# Patient Record
Sex: FEMALE | Race: WHITE | HISPANIC OR LATINO | Employment: FULL TIME | URBAN - METROPOLITAN AREA
[De-identification: names, ages, dates, MRNs, and addresses within clinical notes are randomized per-mention and may not be internally consistent; named-entity substitution may affect disease eponyms.]

---

## 2018-03-14 ENCOUNTER — HOSPITAL ENCOUNTER (OUTPATIENT)
Dept: RADIOLOGY | Facility: MEDICAL CENTER | Age: 18
End: 2018-03-14
Attending: OBSTETRICS & GYNECOLOGY
Payer: COMMERCIAL

## 2018-03-14 DIAGNOSIS — R10.2 PELVIC PAIN IN FEMALE: ICD-10-CM

## 2018-03-14 PROCEDURE — 76830 TRANSVAGINAL US NON-OB: CPT

## 2018-07-14 ENCOUNTER — APPOINTMENT (OUTPATIENT)
Dept: RADIOLOGY | Facility: IMAGING CENTER | Age: 18
End: 2018-07-14
Attending: NURSE PRACTITIONER
Payer: COMMERCIAL

## 2018-07-14 ENCOUNTER — OFFICE VISIT (OUTPATIENT)
Dept: URGENT CARE | Facility: PHYSICIAN GROUP | Age: 18
End: 2018-07-14
Payer: COMMERCIAL

## 2018-07-14 VITALS
TEMPERATURE: 98.5 F | BODY MASS INDEX: 26.36 KG/M2 | WEIGHT: 148.8 LBS | OXYGEN SATURATION: 98 % | SYSTOLIC BLOOD PRESSURE: 114 MMHG | DIASTOLIC BLOOD PRESSURE: 76 MMHG | HEART RATE: 74 BPM | HEIGHT: 63 IN

## 2018-07-14 DIAGNOSIS — S69.92XA INJURY OF LEFT THUMB, INITIAL ENCOUNTER: ICD-10-CM

## 2018-07-14 DIAGNOSIS — M79.642 LEFT HAND PAIN: ICD-10-CM

## 2018-07-14 DIAGNOSIS — S63.602A SPRAIN OF LEFT THUMB, INITIAL ENCOUNTER: ICD-10-CM

## 2018-07-14 DIAGNOSIS — S60.112A: ICD-10-CM

## 2018-07-14 PROCEDURE — 73130 X-RAY EXAM OF HAND: CPT | Mod: TC,LT | Performed by: NURSE PRACTITIONER

## 2018-07-14 PROCEDURE — 99213 OFFICE O/P EST LOW 20 MIN: CPT | Performed by: NURSE PRACTITIONER

## 2018-07-14 ASSESSMENT — ENCOUNTER SYMPTOMS
FALLS: 1
WEAKNESS: 0
CHILLS: 0
TINGLING: 0
MYALGIAS: 1
BRUISES/BLEEDS EASILY: 0
SENSORY CHANGE: 0
FEVER: 0

## 2018-07-14 NOTE — PROGRESS NOTES
Subjective:      Joanne Mckay is a 17 y.o. female who presents with Finger Injury (L thumb injury, swelling, blood, pt fell off of chair and it landed on her hand, onset last night at 10pm)            HPI  Joanne is here for left thumb injury. States fell off chair last night and it fell on her left hand. States increased swelling, pain, bruising to left thumb since this morning. Ibuprofen 200 mg and ice x 20 minutes this morning. Right handed. 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 Denies numbness/tingling to left hand.    PMH:  has a past medical history of Anesthesia and Pain.  MEDS:   Current Outpatient Prescriptions:   •  NON SPECIFIED, , Disp: , Rfl:   •  ondansetron (ZOFRAN ODT) 4 MG TBDP, Take 1 Tab by mouth every 6 hours as needed for Nausea/Vomiting., Disp: 10 Tab, Rfl: 0  •  ibuprofen (MOTRIN) 400 MG TABS, Take 1 Tab by mouth every 6 hours as needed for Mild Pain., Disp: 30 Tab, Rfl: 0  ALLERGIES:   Allergies   Allergen Reactions   • Cefzil [Cefprozil] Rash     SURGHX:   Past Surgical History:   Procedure Laterality Date   • RECOVERY  3/24/2014    Performed by [facility] at SURGERY SAME DAY [facility] ORS   • GASTROSCOPY WITH BIOPSY  7/8/2013    Performed by Julissa Wolfe M.D. at SURGERY [facility] ORS   • OTHER  2010    tonsillectomy     SOCHX:  reports that she has never smoked. She has never used smokeless tobacco. She reports that she does not drink alcohol or use drugs.  FH: Family history was reviewed, no pertinent findings to report    Review of Systems   Constitutional: Negative for chills, fever and malaise/fatigue.   Musculoskeletal: Positive for falls, joint pain and myalgias.   Neurological: Negative for tingling, sensory change and weakness.   Endo/Heme/Allergies: Does not bruise/bleed easily.   All other systems reviewed and are negative.         Objective:     /76   Pulse 74   Temp 36.9 °C (98.5 °F)   Ht 1.6 m  "(5' 3\")   Wt 67.5 kg (148 lb 12.8 oz)   SpO2 98%   BMI 26.36 kg/m²      Physical Exam   Constitutional: She is oriented to person, place, and time. Vital signs are normal. She appears well-developed and well-nourished. She is active and cooperative.  Non-toxic appearance. She does not have a sickly appearance. She does not appear ill. No distress.   HENT:   Head: Normocephalic.   Eyes: Conjunctivae and EOM are normal. Pupils are equal, round, and reactive to light.   Cardiovascular: Normal rate.    Pulmonary/Chest: Effort normal.   Musculoskeletal:        Left hand: She exhibits tenderness, bony tenderness and swelling. She exhibits normal range of motion, normal capillary refill, no deformity and no laceration. Normal sensation noted. Decreased strength noted. She exhibits finger abduction and thumb/finger opposition.        Hands:  Swelling 2+ to left thumb from distal tip to left wrist on radial aspect. Bruising throughout, decreased ROM with fist making, finger opposition of thumb. TTP at entire thumb.   Neurological: She is alert and oriented to person, place, and time.   Skin: Skin is warm and dry. She is not diaphoretic.   Vitals reviewed.            Left thumb xray FINDINGS:  No fracture, dislocation, or acute joint abnormality is present.    MA applied thumb spica velcro wrist splint.  Assessment/Plan:     1. Injury of left thumb, initial encounter    - DX-HAND 3+ RIGHT; Future    2. Contusion of thumb (nail), left, initial encounter    3. Sprain of left thumb, initial encounter    May use NSAID prn for pain/swelling  May use cool compresses for swelling prn  May utilize RICE method prn  Avoid excessive weight lifting until pain/swelling improves  May apply topical analgesics prn  Perform proper body mechanics with lifting, twisting, bending and reaching. Ask for assistance with heavy objects  Monitor for deformity, numbness/tingling in fingers, decreased ROM with lifting difficulty- need " re-evaluation

## 2018-11-16 ENCOUNTER — HOSPITAL ENCOUNTER (OUTPATIENT)
Dept: RADIOLOGY | Facility: MEDICAL CENTER | Age: 18
End: 2018-11-16
Attending: PHYSICIAN ASSISTANT
Payer: COMMERCIAL

## 2018-11-16 ENCOUNTER — HOSPITAL ENCOUNTER (OUTPATIENT)
Facility: MEDICAL CENTER | Age: 18
End: 2018-11-16
Attending: PHYSICIAN ASSISTANT
Payer: COMMERCIAL

## 2018-11-16 ENCOUNTER — OFFICE VISIT (OUTPATIENT)
Dept: URGENT CARE | Facility: PHYSICIAN GROUP | Age: 18
End: 2018-11-16
Payer: COMMERCIAL

## 2018-11-16 VITALS
DIASTOLIC BLOOD PRESSURE: 60 MMHG | TEMPERATURE: 97.8 F | HEART RATE: 58 BPM | RESPIRATION RATE: 14 BRPM | SYSTOLIC BLOOD PRESSURE: 104 MMHG | WEIGHT: 150 LBS | BODY MASS INDEX: 26.58 KG/M2 | OXYGEN SATURATION: 98 % | HEIGHT: 63 IN

## 2018-11-16 DIAGNOSIS — R10.2 PELVIC PAIN: ICD-10-CM

## 2018-11-16 LAB
APPEARANCE UR: CLEAR
BILIRUB UR STRIP-MCNC: NORMAL MG/DL
COLOR UR AUTO: NORMAL
GLUCOSE UR STRIP.AUTO-MCNC: NORMAL MG/DL
INT CON NEG: NEGATIVE
INT CON POS: POSITIVE
KETONES UR STRIP.AUTO-MCNC: NORMAL MG/DL
LEUKOCYTE ESTERASE UR QL STRIP.AUTO: NORMAL
NITRITE UR QL STRIP.AUTO: NORMAL
PH UR STRIP.AUTO: 7 [PH] (ref 5–8)
POC URINE PREGNANCY TEST: NORMAL
PROT UR QL STRIP: NORMAL MG/DL
RBC UR QL AUTO: NORMAL
SP GR UR STRIP.AUTO: 1.01
UROBILINOGEN UR STRIP-MCNC: NORMAL MG/DL

## 2018-11-16 PROCEDURE — 87591 N.GONORRHOEAE DNA AMP PROB: CPT

## 2018-11-16 PROCEDURE — 87480 CANDIDA DNA DIR PROBE: CPT

## 2018-11-16 PROCEDURE — 99214 OFFICE O/P EST MOD 30 MIN: CPT | Performed by: PHYSICIAN ASSISTANT

## 2018-11-16 PROCEDURE — 76830 TRANSVAGINAL US NON-OB: CPT

## 2018-11-16 PROCEDURE — 87510 GARDNER VAG DNA DIR PROBE: CPT

## 2018-11-16 PROCEDURE — 87491 CHLMYD TRACH DNA AMP PROBE: CPT

## 2018-11-16 PROCEDURE — 81025 URINE PREGNANCY TEST: CPT | Performed by: PHYSICIAN ASSISTANT

## 2018-11-16 PROCEDURE — 87660 TRICHOMONAS VAGIN DIR PROBE: CPT

## 2018-11-16 PROCEDURE — 81002 URINALYSIS NONAUTO W/O SCOPE: CPT | Performed by: PHYSICIAN ASSISTANT

## 2018-11-16 ASSESSMENT — ENCOUNTER SYMPTOMS
ABDOMINAL PAIN: 1
SHORTNESS OF BREATH: 0
FEVER: 0
VOMITING: 0
PAIN: 1
DIAPHORESIS: 0
COUGH: 0
WEAKNESS: 0
PALPITATIONS: 0
WEIGHT LOSS: 0
DIZZINESS: 0
HEADACHES: 0
CHILLS: 0

## 2018-11-16 NOTE — PROGRESS NOTES
Subjective:      Joanne Mckay is a 18 y.o. female who presents with Pain (uterus pain x 2 weeks )            Pain   This is a new problem. Episode onset: 2 weeks ago. The problem occurs constantly. The problem has been unchanged. Associated symptoms include abdominal pain. Pertinent negatives include no chest pain, chills, coughing, diaphoresis, fever, headaches, rash, urinary symptoms, vomiting or weakness. Associated symptoms comments: Pelvic pain. Nothing aggravates the symptoms. She has tried nothing for the symptoms.       Review of Systems   Constitutional: Positive for malaise/fatigue. Negative for chills, diaphoresis, fever and weight loss.   Respiratory: Negative for cough and shortness of breath.    Cardiovascular: Negative for chest pain and palpitations.   Gastrointestinal: Positive for abdominal pain. Negative for vomiting.   Skin: Negative for itching and rash.   Neurological: Negative for dizziness, weakness and headaches.   All other systems reviewed and are negative.    PMH:  has a past medical history of Anesthesia and Pain.  MEDS:   Current Outpatient Prescriptions:   •  NON SPECIFIED, , Disp: , Rfl:   •  ondansetron (ZOFRAN ODT) 4 MG TBDP, Take 1 Tab by mouth every 6 hours as needed for Nausea/Vomiting., Disp: 10 Tab, Rfl: 0  •  ibuprofen (MOTRIN) 400 MG TABS, Take 1 Tab by mouth every 6 hours as needed for Mild Pain., Disp: 30 Tab, Rfl: 0  ALLERGIES:   Allergies   Allergen Reactions   • Cefzil [Cefprozil] Rash     SURGHX:   Past Surgical History:   Procedure Laterality Date   • RECOVERY  3/24/2014    Performed by -[facility] Surgery at SURGERY SAME DAY [facility] ORS   • GASTROSCOPY WITH BIOPSY  7/8/2013    Performed by Julissa Wolfe M.D. at SURGERY [facility] ORS   • OTHER  2010    tonsillectomy     SOCHX:  reports that she has never smoked. She has never used smokeless tobacco. She reports that she does not drink alcohol or use drugs.  FH: Family history was reviewed, no  "pertinent findings to report  Medications, Allergies, and current problem list reviewed today in Epic       Objective:     /60   Pulse (!) 58   Temp 36.6 °C (97.8 °F) (Temporal)   Resp 14   Ht 1.6 m (5' 3\")   Wt 68 kg (150 lb)   SpO2 98%   BMI 26.57 kg/m²      Physical Exam   Constitutional: She is oriented to person, place, and time. She appears well-developed and well-nourished. She is active.  Non-toxic appearance. She does not have a sickly appearance. She does not appear ill. No distress.   HENT:   Head: Normocephalic and atraumatic.   Right Ear: External ear normal.   Left Ear: External ear normal.   Nose: Nose normal.   Mouth/Throat: Oropharynx is clear and moist.   Eyes: Conjunctivae, EOM and lids are normal.   Neck: Normal range of motion and full passive range of motion without pain. Neck supple.   Cardiovascular: Normal rate, regular rhythm, S1 normal, S2 normal and normal heart sounds.  Exam reveals no gallop and no friction rub.    No murmur heard.  Pulmonary/Chest: Effort normal and breath sounds normal. No respiratory distress. She has no decreased breath sounds. She has no wheezes. She has no rales. She exhibits no tenderness.   Abdominal: Soft. Normal appearance and bowel sounds are normal. She exhibits no shifting dullness, no distension, no pulsatile liver, no fluid wave, no abdominal bruit, no ascites, no pulsatile midline mass and no mass. There is tenderness. There is no rigidity, no rebound, no guarding, no CVA tenderness, no tenderness at McBurney's point and negative Stapleton's sign. No hernia.   Genitourinary: Vagina normal.   Musculoskeletal: Normal range of motion. She exhibits no edema, tenderness or deformity.   Neurological: She is alert and oriented to person, place, and time.   Skin: Skin is warm, dry and intact. She is not diaphoretic.   Psychiatric: She has a normal mood and affect. Her speech is normal and behavior is normal. Judgment and thought content normal. " Cognition and memory are normal.   Vitals reviewed.              11/16/2018 2:51 PM    HISTORY/REASON FOR EXAM:  Pelvic pain      TECHNIQUE/EXAM DESCRIPTION:  Transabdominal and transvaginal pelvic ultrasound.    COMPARISON:   None    FINDINGS:  Both transabdominal and transvaginal scanning were performed to optimally visualize the pelvis.    The uterus measures 3.06 cm x 8.20 cm x 3.90 cm.  The uterine myometrium is within normal limits.  The endometrial echo complex measures 0.33 cm. There is an IUD present within the endometrial cavity.    Low resistive waveforms are confirmed within the ovaries.  The right ovary measures 4.33 cm x 2.87 cm x 2.51 cm.    The left ovary measures 3.08 cm x 1.58 cm x 2.28 cm.        There is no free fluid seen.   Impression       1.  IUD present within the endometrial cavity.    2.  No evidence of adnexal mass.       Assessment/Plan:   Patient is a 18-year-old female who presents with pelvic pain for 2 weeks.  She states that the pain is been constant and worsening in severity.  She states that she has an IUD is and is fearful it is out of place.  She denies any discharge or urinary symptoms.  Vital signs normal.  Mild pain to palpation no suprapubic area.  There is no rebound or guarding.  Pelvic exam was done with medical assistant in room.  The tail of the IUD is present in the cervical canal.  No significant purulent discharge is seen.  Ultrasound of the area shows no abnormalities.  Further treatment pending labs.    1. Pelvic pain    - POCT Urinalysis  - POCT Pregnancy  - CHLAMYDIA/GC PCR URINE OR SWAB; Future  - VAGINAL PATHOGENS DNA PANEL; Future  - US-PELVIC COMPLETE (TRANSABDOMINAL/TRANSVAGINAL) (COMBO); Future    Differential diagnosis, natural history, supportive care discussed. Follow-up with primary care provider within 7-10 days, emergency room precautions discussed.  Patient and/or family appears understanding of information.  Handout and review of patients diagnosis  and treatment was discussed extensively.

## 2018-11-17 LAB
C TRACH DNA SPEC QL NAA+PROBE: NEGATIVE
CANDIDA DNA VAG QL PROBE+SIG AMP: NEGATIVE
G VAGINALIS DNA VAG QL PROBE+SIG AMP: POSITIVE
N GONORRHOEA DNA SPEC QL NAA+PROBE: NEGATIVE
SPECIMEN SOURCE: NORMAL
T VAGINALIS DNA VAG QL PROBE+SIG AMP: NEGATIVE

## 2018-11-18 ENCOUNTER — TELEPHONE (OUTPATIENT)
Dept: URGENT CARE | Facility: CLINIC | Age: 18
End: 2018-11-18

## 2018-11-18 DIAGNOSIS — N76.0 BV (BACTERIAL VAGINOSIS): ICD-10-CM

## 2018-11-18 DIAGNOSIS — B96.89 BV (BACTERIAL VAGINOSIS): ICD-10-CM

## 2018-11-18 RX ORDER — CLINDAMYCIN HYDROCHLORIDE 300 MG/1
300 CAPSULE ORAL 2 TIMES DAILY
Qty: 14 CAP | Refills: 0 | Status: SHIPPED | OUTPATIENT
Start: 2018-11-18 | End: 2018-11-25

## 2023-01-08 ENCOUNTER — HOSPITAL ENCOUNTER (EMERGENCY)
Facility: MEDICAL CENTER | Age: 23
End: 2023-01-08
Attending: STUDENT IN AN ORGANIZED HEALTH CARE EDUCATION/TRAINING PROGRAM
Payer: COMMERCIAL

## 2023-01-08 VITALS
HEIGHT: 63 IN | HEART RATE: 66 BPM | SYSTOLIC BLOOD PRESSURE: 100 MMHG | OXYGEN SATURATION: 97 % | RESPIRATION RATE: 18 BRPM | DIASTOLIC BLOOD PRESSURE: 57 MMHG | BODY MASS INDEX: 31.09 KG/M2 | WEIGHT: 175.49 LBS | TEMPERATURE: 97.7 F

## 2023-01-08 DIAGNOSIS — Z3A.15 15 WEEKS GESTATION OF PREGNANCY: ICD-10-CM

## 2023-01-08 DIAGNOSIS — E86.0 DEHYDRATION: ICD-10-CM

## 2023-01-08 DIAGNOSIS — R10.10 PAIN OF UPPER ABDOMEN: ICD-10-CM

## 2023-01-08 LAB
ALBUMIN SERPL BCP-MCNC: 4 G/DL (ref 3.2–4.9)
ALBUMIN/GLOB SERPL: 1.4 G/DL
ALP SERPL-CCNC: 69 U/L (ref 30–99)
ALT SERPL-CCNC: 32 U/L (ref 2–50)
ANION GAP SERPL CALC-SCNC: 13 MMOL/L (ref 7–16)
AST SERPL-CCNC: 29 U/L (ref 12–45)
B-HCG SERPL-ACNC: ABNORMAL MIU/ML (ref 0–5)
BASOPHILS # BLD AUTO: 0.5 % (ref 0–1.8)
BASOPHILS # BLD: 0.05 K/UL (ref 0–0.12)
BILIRUB SERPL-MCNC: 0.4 MG/DL (ref 0.1–1.5)
BUN SERPL-MCNC: 6 MG/DL (ref 8–22)
CALCIUM ALBUM COR SERPL-MCNC: 9.5 MG/DL (ref 8.5–10.5)
CALCIUM SERPL-MCNC: 9.5 MG/DL (ref 8.5–10.5)
CHLORIDE SERPL-SCNC: 103 MMOL/L (ref 96–112)
CO2 SERPL-SCNC: 21 MMOL/L (ref 20–33)
CREAT SERPL-MCNC: 0.35 MG/DL (ref 0.5–1.4)
EOSINOPHIL # BLD AUTO: 0.09 K/UL (ref 0–0.51)
EOSINOPHIL NFR BLD: 0.8 % (ref 0–6.9)
ERYTHROCYTE [DISTWIDTH] IN BLOOD BY AUTOMATED COUNT: 39.6 FL (ref 35.9–50)
GFR SERPLBLD CREATININE-BSD FMLA CKD-EPI: 146 ML/MIN/1.73 M 2
GLOBULIN SER CALC-MCNC: 2.8 G/DL (ref 1.9–3.5)
GLUCOSE SERPL-MCNC: 85 MG/DL (ref 65–99)
HCT VFR BLD AUTO: 39.3 % (ref 37–47)
HGB BLD-MCNC: 13.6 G/DL (ref 12–16)
IMM GRANULOCYTES # BLD AUTO: 0.08 K/UL (ref 0–0.11)
IMM GRANULOCYTES NFR BLD AUTO: 0.8 % (ref 0–0.9)
LIPASE SERPL-CCNC: 37 U/L (ref 11–82)
LYMPHOCYTES # BLD AUTO: 1.86 K/UL (ref 1–4.8)
LYMPHOCYTES NFR BLD: 17.5 % (ref 22–41)
MCH RBC QN AUTO: 31.5 PG (ref 27–33)
MCHC RBC AUTO-ENTMCNC: 34.6 G/DL (ref 33.6–35)
MCV RBC AUTO: 91 FL (ref 81.4–97.8)
MONOCYTES # BLD AUTO: 0.71 K/UL (ref 0–0.85)
MONOCYTES NFR BLD AUTO: 6.7 % (ref 0–13.4)
NEUTROPHILS # BLD AUTO: 7.83 K/UL (ref 2–7.15)
NEUTROPHILS NFR BLD: 73.7 % (ref 44–72)
NRBC # BLD AUTO: 0 K/UL
NRBC BLD-RTO: 0 /100 WBC
PLATELET # BLD AUTO: 339 K/UL (ref 164–446)
PMV BLD AUTO: 10.1 FL (ref 9–12.9)
POTASSIUM SERPL-SCNC: 3.5 MMOL/L (ref 3.6–5.5)
PROT SERPL-MCNC: 6.8 G/DL (ref 6–8.2)
RBC # BLD AUTO: 4.32 M/UL (ref 4.2–5.4)
SODIUM SERPL-SCNC: 137 MMOL/L (ref 135–145)
WBC # BLD AUTO: 10.6 K/UL (ref 4.8–10.8)

## 2023-01-08 PROCEDURE — 80053 COMPREHEN METABOLIC PANEL: CPT

## 2023-01-08 PROCEDURE — 83690 ASSAY OF LIPASE: CPT

## 2023-01-08 PROCEDURE — 700105 HCHG RX REV CODE 258: Performed by: STUDENT IN AN ORGANIZED HEALTH CARE EDUCATION/TRAINING PROGRAM

## 2023-01-08 PROCEDURE — 84702 CHORIONIC GONADOTROPIN TEST: CPT

## 2023-01-08 PROCEDURE — 85025 COMPLETE CBC W/AUTO DIFF WBC: CPT

## 2023-01-08 PROCEDURE — 36415 COLL VENOUS BLD VENIPUNCTURE: CPT

## 2023-01-08 PROCEDURE — 700102 HCHG RX REV CODE 250 W/ 637 OVERRIDE(OP): Performed by: STUDENT IN AN ORGANIZED HEALTH CARE EDUCATION/TRAINING PROGRAM

## 2023-01-08 PROCEDURE — 99284 EMERGENCY DEPT VISIT MOD MDM: CPT

## 2023-01-08 PROCEDURE — A9270 NON-COVERED ITEM OR SERVICE: HCPCS | Performed by: STUDENT IN AN ORGANIZED HEALTH CARE EDUCATION/TRAINING PROGRAM

## 2023-01-08 RX ORDER — ACETAMINOPHEN 500 MG
1000 TABLET ORAL ONCE
Status: COMPLETED | OUTPATIENT
Start: 2023-01-08 | End: 2023-01-08

## 2023-01-08 RX ORDER — SODIUM CHLORIDE 9 MG/ML
1000 INJECTION, SOLUTION INTRAVENOUS ONCE
Status: COMPLETED | OUTPATIENT
Start: 2023-01-08 | End: 2023-01-08

## 2023-01-08 RX ADMIN — SODIUM CHLORIDE 1000 ML: 9 INJECTION, SOLUTION INTRAVENOUS at 05:01

## 2023-01-08 RX ADMIN — ACETAMINOPHEN 1000 MG: 500 TABLET ORAL at 05:01

## 2023-01-08 NOTE — ED PROVIDER NOTES
"ED Provider Note    CHIEF COMPLAINT  Chief Complaint   Patient presents with    Abdominal Pain     1.5x hours ago pt states she was sleeping and woke up with sharp abd pain across diaphragm that radiates into back, Pt denies N/V or vaginal bleeding.     Pregnancy     15 weeks 5 days       EXTERNAL RECORDS REVIEWED  Select: Other      HPI/ROS  LIMITATION TO HISTORY   Select: : None  OUTSIDE HISTORIAN(S):  Select: Significant other      Joanne Yao is a 22 y.o. female who presents evaluation of intermittent episodes of bilateral upper abdominal cramping which started about 2 AM this morning.  Patient is a  at 15 weeks and 5 weeks gestation via ultrasound dating.  States she was sleeping and woke up with a sharp pain in her bilateral upper quadrants of her abdomen.  The last approximately 2 minutes is moderate in severity nonradiating and then resolved.  Said no fevers no nausea no vomiting denies any diarrhea increased urinary frequency urgency.  Denies any lower abdominal pain vaginal bleeding vaginal discharge.    PAST MEDICAL HISTORY       SURGICAL HISTORY  patient denies any surgical history    FAMILY HISTORY  History reviewed. No pertinent family history.    SOCIAL HISTORY  Social History     Tobacco Use    Smoking status: Never    Smokeless tobacco: Never   Vaping Use    Vaping Use: Never used   Substance and Sexual Activity    Alcohol use: Not Currently    Drug use: Not Currently    Sexual activity: Not on file       CURRENT MEDICATIONS  Home Medications       Reviewed by Rylan Morales R.N. (Registered Nurse) on 23 at 0342  Med List Status: Not Addressed     Medication Last Dose Status        Patient Noah Taking any Medications                           ALLERGIES  Allergies   Allergen Reactions    Banana        PHYSICAL EXAM  VITAL SIGNS: /72   Pulse 89   Temp 36.6 °C (97.8 °F) (Temporal)   Resp 16   Ht 1.6 m (5' 3\")   Wt 79.6 kg (175 lb 7.8 oz)   SpO2 99%   BMI 31.09 " kg/m²      Pulse ox interpretation: I interpret this pulse ox as normal.  VITALS - vital signs documented prior to this note have been reviewed and noted,  GENERAL - awake, alert, oriented, GCS 15, no apparent distress, non-toxic  appearing  HEENT - normocephalic, atraumatic, pupils equal, sclera anicteric, mucus  membranes moist  NECK - supple, no meningismus, full active range of motion, trachea midline  CARDIOVASCULAR - regular rate/rhythm, no murmurs/gallops/rubs  PULMONARY - no respiratory distress, speaking in full sentences, clear to  auscultation bilaterally, no wheezing/ronchi/rales, no accessory muscle use  GASTROINTESTINAL -gravid bowel sounds present normal active negative Stapleton sign no McBurney's point tenderness no rebound guarding or peritonitis no CVA tenderness no palpable abdominal masses no overlying abrasions lesions or contusions  GENITOURINARY - Deferred  NEUROLOGIC - Awake alert, normal mental status, speech fluid, cognition  normal, moves all extremities  MUSCULOSKELETAL - no obvious asymmetry or deformities present  EXTREMITIES - warm, well-perfused, no cyanosis or significant edema  DERMATOLOGIC - warm, dry, no rashes, no jaundice  PSYCHIATRIC - normal affect, normal insight, normal concentration    DIAGNOSTIC STUDIES / PROCEDURES      LABS  Obtained remarkable for a positive hCG otherwise nonactionable point-of-care ultrasound shows a single live IUP with active fetal motion with a heart rate of 134    RADIOLOGY  I have independently interpreted the diagnostic imaging associated with this visit and am waiting the final reading from the radiologist.           COURSE & MEDICAL DECISION MAKING    ED Observation Status? No; Patient does not meet criteria for ED Observation.     INITIAL ASSESSMENT AND PLAN  Care Narrative:     Presents for evaluation of bilateral upper abdominal cramping.  Differential initially included was not limited to cholecystitis symptomatic cholelithiasis pancreatitis  hepatitis gastritis enteritis, appendicitis, among many other considerations.  On examination she has a benign and reassuring abdominal exam.  Labs were obtained to evaluate for above, were nonactionable.  A bedside ultrasound was performed did show a single live IUP.  Patient did feel better after IV fluids and Tylenol.  Unclear exact etiology of her abdominal discomfort however at this point in time her history and physical exam is not consistent with an underlying acute appendicitis, cholecystitis, perforated  bowel or viscus, and at this point I do not see an indication that would warrant emergent CT abdomen pelvis at this time and believe the risks of radiation and contrast outweigh the benefits at this time.  Shared decision making conversation was had with the patient.  Repeat abdominal exam in the emergency department was reassuring showing no rebound guarding or peritonitis patient was able to tolerate p.o. prior to discharge. Instructed to return in 24-48 hours for an abdominal re check if persisting symptoms or worsening symptoms. We did discuss the symptoms return was concerning and necessitate immediate return, and also discussed close follow-up with primary doctor.      ADDITIONAL PROBLEM LIST AND DISPOSITION    Problem #1: Abdominal pain labs IV fluids Tylenol  Problem #2: Intrauterine pregnancy OB follow-up    I have discussed management of the patient with the following physicians and PARISH's: None    Discussion of management with other QHP or appropriate source(s): Select: None     Escalation of care considered, and ultimately not performed: diagnostic imaging.  Given the reassuring labs do not see an indication for CT abdomen pelvis, no right upper quadrant tenderness negative Stapleton sign doubt underlying cholecystitis do not see an indication for right upper quadrant ultrasound.[email or web address]    Barriers to care at this time, including but not limited to: Select: None .     Decision tools and prescription  drugs considered including, but not limited to: Select: Pain Medications   .    HYDRATION: Based on the patient's presentation of Dehydration the patient was given IV fluids. IV Hydration was used because oral hydration was not as rapid as required. Upon recheck following hydration, the patient was improved.        FINAL DIAGNOSIS  1. Pain of upper abdomen    2. 15 weeks gestation of pregnancy    3. Dehydration           Electronically signed by: Enoc Calle D.O., 1/8/2023 5:01 AM

## 2023-01-08 NOTE — DISCHARGE INSTRUCTIONS
If you develop any fevers constant right upper quadrant abdominal pain or unable to eat or drink develop any vaginal bleeding severe lower abdominal pain return right away for recheck stay well-hydrated Tylenol as needed for discomfort return with any other new or concerning symptoms

## 2023-01-08 NOTE — ED TRIAGE NOTES
Chief Complaint   Patient presents with    Abdominal Pain     1.5x hours ago pt states she was sleeping and woke up with sharp abd pain across diaphragm that radiates into back, Pt denies N/V or vaginal bleeding.     Pregnancy     15 weeks 5 days     Pt ambulatory to triage for above complaint. Pt states she saw her OB on 12/5, received an US with normal results.      Pt is alert/oriented and follows commands. Pt speaking in full sentences and responds appropriately to questions. No acute distress noted in triage and respirations are even and unlabored.     Pt placed in lobby and educated on triage process. Pt encouraged to alert staff for any changes in condition.

## 2023-01-08 NOTE — ED NOTES
Round on pt and family. Respirations are even and unlabored. No further assistance needed at this time.

## 2023-01-08 NOTE — ED NOTES
Pt ambulated with normal steady gait from ER lobby to pt room. Pt asked to dress in gown. BP cuff and pulse ox attached to cardiac monitor. Chart up for ERP.

## 2023-06-09 NOTE — ED NOTES
Discharge instructions provided to pt. ERP went over discharge instructions with pt. Pt instructed to follow up with PCP. Pt instructed to return to emergency department for new or worsening symptoms. Pt verbalized understanding of discharge instructions.     [Normocephalic] : normocephalic [EOMI] : extra ocular movement intact [Supple] : supple [No Supraclavicular Adenopathy] : no supraclavicular adenopathy [No Cervical Adenopathy] : no cervical adenopathy [de-identified] : Bilateral breast/axilla/supraclavicular area: No masses, discharge, or adenopathy\par

## 2023-10-09 PROBLEM — G56.03 BILATERAL CARPAL TUNNEL SYNDROME: Status: ACTIVE | Noted: 2023-10-09

## 2023-10-30 PROBLEM — G56.01 RIGHT CARPAL TUNNEL SYNDROME: Status: ACTIVE | Noted: 2023-10-30

## 2024-11-15 ENCOUNTER — OFFICE VISIT (OUTPATIENT)
Dept: URGENT CARE | Facility: PHYSICIAN GROUP | Age: 24
End: 2024-11-15
Payer: COMMERCIAL

## 2024-11-15 VITALS
RESPIRATION RATE: 16 BRPM | DIASTOLIC BLOOD PRESSURE: 66 MMHG | HEIGHT: 63 IN | WEIGHT: 162 LBS | HEART RATE: 95 BPM | TEMPERATURE: 98.4 F | BODY MASS INDEX: 28.7 KG/M2 | SYSTOLIC BLOOD PRESSURE: 114 MMHG | OXYGEN SATURATION: 99 %

## 2024-11-15 DIAGNOSIS — R06.02 SOB (SHORTNESS OF BREATH): ICD-10-CM

## 2024-11-15 DIAGNOSIS — J01.90 ACUTE BACTERIAL SINUSITIS: ICD-10-CM

## 2024-11-15 DIAGNOSIS — B96.89 ACUTE BACTERIAL SINUSITIS: ICD-10-CM

## 2024-11-15 PROCEDURE — 3074F SYST BP LT 130 MM HG: CPT | Performed by: NURSE PRACTITIONER

## 2024-11-15 PROCEDURE — 3078F DIAST BP <80 MM HG: CPT | Performed by: NURSE PRACTITIONER

## 2024-11-15 PROCEDURE — 99203 OFFICE O/P NEW LOW 30 MIN: CPT | Performed by: NURSE PRACTITIONER

## 2024-11-15 RX ORDER — ALBUTEROL SULFATE 90 UG/1
2 INHALANT RESPIRATORY (INHALATION) EVERY 6 HOURS PRN
Qty: 8.5 G | Refills: 0 | Status: SHIPPED | OUTPATIENT
Start: 2024-11-15

## 2024-11-15 RX ORDER — IPRATROPIUM BROMIDE 21 UG/1
SPRAY, METERED NASAL
COMMUNITY
Start: 2024-11-13

## 2024-11-15 RX ORDER — FLUTICASONE PROPIONATE 50 MCG
2 SPRAY, SUSPENSION (ML) NASAL DAILY
Qty: 9.9 ML | Refills: 0 | Status: SHIPPED | OUTPATIENT
Start: 2024-11-15

## 2024-11-15 RX ORDER — METOCLOPRAMIDE 10 MG/1
TABLET ORAL
COMMUNITY

## 2024-11-15 RX ORDER — BENZONATATE 100 MG/1
100 CAPSULE ORAL 3 TIMES DAILY PRN
Qty: 60 CAPSULE | Refills: 0 | Status: SHIPPED | OUTPATIENT
Start: 2024-11-15

## 2024-11-15 RX ORDER — ERGOCALCIFEROL 1.25 MG/1
1 CAPSULE, LIQUID FILLED ORAL
COMMUNITY

## 2024-11-15 ASSESSMENT — ENCOUNTER SYMPTOMS
CARDIOVASCULAR NEGATIVE: 1
NEUROLOGICAL NEGATIVE: 1
CHILLS: 0
SHORTNESS OF BREATH: 1
WHEEZING: 0
SORE THROAT: 0
MUSCULOSKELETAL NEGATIVE: 1
SINUS PAIN: 1
EYE DISCHARGE: 0
FEVER: 0
GASTROINTESTINAL NEGATIVE: 1
EYE REDNESS: 0
COUGH: 1

## 2024-11-15 ASSESSMENT — FIBROSIS 4 INDEX: FIB4 SCORE: 0.39

## 2024-11-15 NOTE — PROGRESS NOTES
Subjective     Joanne Yao is a 24 y.o. female who presents with Cough (Congested x 2 weeks )            Cough  Associated symptoms include shortness of breath. Pertinent negatives include no chills, ear pain, eye redness, fever, sore throat or wheezing. There is no history of environmental allergies.   Joanne has come into urgent care today as she has been experiencing upper respiratory symptoms x 2 weeks.  Nasal congestion, runny nose, postnasal drainage, dry scratchy throat, cough.  Experiencing shortness of breath without wheezing.  No history of asthma or seasonal allergies.  Has been using over-the-counter DayQuil x 3-4 days.  No nasal spray or rinse, no salt water gargle.  Denies fever, nausea, vomiting.    PMH:  has a past medical history of Anesthesia and Pain.  MEDS:   Current Outpatient Medications:   •  amoxicillin-clavulanate (AUGMENTIN) 875-125 MG Tab, Take 1 Tablet by mouth 2 times a day for 5 days., Disp: 10 Tablet, Rfl: 0  •  albuterol 108 (90 Base) MCG/ACT Aero Soln inhalation aerosol, Inhale 2 Puffs every 6 hours as needed for Shortness of Breath., Disp: 8.5 g, Rfl: 0  •  benzonatate (TESSALON) 100 MG Cap, Take 1 Capsule by mouth 3 times a day as needed for Cough., Disp: 60 Capsule, Rfl: 0  •  fluticasone (FLONASE) 50 MCG/ACT nasal spray, Administer 2 Sprays into affected nostril(S) every day., Disp: 9.9 mL, Rfl: 0  •  vitamin D2, Ergocalciferol, (DRISDOL) 1.25 MG (43636 UT) Cap capsule, 1 Capsule. (Patient not taking: Reported on 11/15/2024), Disp: , Rfl:   •  ipratropium (ATROVENT) 0.03 % Solution, , Disp: , Rfl:   •  metoclopramide (REGLAN) 10 MG Tab, TAKE 1 TABLET (10 MG TOTAL) BY MOUTH EVERY 8 (EIGHT) HOURS IF NEEDED (NAUSEA) FOR UP TO 12 DOSES. (Patient not taking: Reported on 11/15/2024), Disp: , Rfl:   •  Nutritional Supplements (VITAMIN D BOOSTER PO), , Disp: , Rfl:   •  Prenatal Vit-DSS-Fe Cbn-FA (PRENATAL AD PO), Take  by mouth. (Patient not taking: Reported on 11/15/2024),  "Disp: , Rfl:   ALLERGIES:   Allergies   Allergen Reactions   • Banana    • Banana Extract Allergy Skin Test Rash   • Cefzil [Cefprozil] Rash     SURGHX:   Past Surgical History:   Procedure Laterality Date   • [place] NEUROPLASTY & OR TRANSPOS MEDIAN NRV CARPAL GLENDY Left 10/17/2023    Procedure: LEFT HAND OPEN CARPAL TUNNEL RELEASE;  Surgeon: Milton Richards M.D.;  Location: [facility];  Service: Orthopedics   • RECOVERY  3/24/2014    Performed by [facility] at SURGERY SAME DAY [facility] ORS   • GASTROSCOPY WITH BIOPSY  7/8/2013    Performed by Julissa Wolfe M.D. at SURGERY [facility] ORS   • OTHER  2010    tonsillectomy     SOCHX:  reports that she has never smoked. She has never used smokeless tobacco. She reports that she does not currently use alcohol. She reports that she does not currently use drugs.  FH: Family history was reviewed, no pertinent findings to report      Review of Systems   Constitutional:  Negative for chills, fever and malaise/fatigue.   HENT:  Positive for congestion and sinus pain. Negative for ear pain and sore throat.    Eyes:  Negative for discharge and redness.   Respiratory:  Positive for cough and shortness of breath. Negative for wheezing.    Cardiovascular: Negative.    Gastrointestinal: Negative.    Musculoskeletal: Negative.    Neurological: Negative.    Endo/Heme/Allergies:  Negative for environmental allergies.   All other systems reviewed and are negative.             Objective     /66 (BP Location: Right arm, Patient Position: Sitting, BP Cuff Size: Adult)   Pulse 95   Temp 36.9 °C (98.4 °F) (Temporal)   Resp 16   Ht 1.6 m (5' 3\")   Wt 73.5 kg (162 lb)   SpO2 99%   BMI 28.70 kg/m²      Physical Exam  Vitals reviewed.   Constitutional:       General: She is awake. She is not in acute distress.     Appearance: Normal appearance. She is not ill-appearing, toxic-appearing or diaphoretic.   HENT:      Head: Normocephalic.      Right Ear: " Ear canal and external ear normal. A middle ear effusion is present.      Left Ear: Tympanic membrane, ear canal and external ear normal.      Nose: Congestion and rhinorrhea present. Rhinorrhea is clear.      Right Turbinates: Swollen.      Mouth/Throat:      Lips: Pink.      Mouth: Mucous membranes are dry.      Pharynx: Oropharynx is clear. Uvula midline.   Cardiovascular:      Rate and Rhythm: Normal rate.   Pulmonary:      Effort: Pulmonary effort is normal. No tachypnea, accessory muscle usage or respiratory distress.      Breath sounds: Normal breath sounds and air entry. No stridor, decreased air movement or transmitted upper airway sounds. No decreased breath sounds, wheezing, rhonchi or rales.   Skin:     General: Skin is warm and dry.   Neurological:      Mental Status: She is alert and oriented to person, place, and time.   Psychiatric:         Attention and Perception: Attention normal.         Mood and Affect: Mood normal.         Speech: Speech normal.         Behavior: Behavior normal. Behavior is cooperative.                           Assessment & Plan        Assessment & Plan  Acute bacterial sinusitis    Orders:  •  amoxicillin-clavulanate (AUGMENTIN) 875-125 MG Tab; Take 1 Tablet by mouth 2 times a day for 5 days.  •  benzonatate (TESSALON) 100 MG Cap; Take 1 Capsule by mouth 3 times a day as needed for Cough.  •  fluticasone (FLONASE) 50 MCG/ACT nasal spray; Administer 2 Sprays into affected nostril(S) every day.    SOB (shortness of breath)    Orders:  •  albuterol 108 (90 Base) MCG/ACT Aero Soln inhalation aerosol; Inhale 2 Puffs every 6 hours as needed for Shortness of Breath.     -Discussed side effect on longer durations of oral decongestants due to drying effect that can contribute to increased nasal sinus/ear pressures and drier oral mucus membranes- recommend to discontinue at this time  -Recommend the following:  -Maintain hydration/water intake  -Get rest  -May use over the counter  Ibuprofen/Tylenol as needed for any fever, body aches or ear/sinus/throat pain  -May take longer acting antihistamine for nasal congestion/runny nose/post nasal drip symptoms (chose one like Claritin/Zyrtec/Allegra without decongestant) x 1 week then as needed   -May use over the counter saline nasal spray for nasal congestion/post nasal drip up to 4x/day  -May use over the counter Flonase or Nasocort for sinus nasal congestion/ear pressure as needed x 1 week then as needed   -May gargle with salt water as needed for throat discomfort up to 4x/day (1 tsp salt in one cup warm water)  -May drink smoothies/soft foods/warm liquids if too painful to swallow solid foods  -Monitor for worsening or persistent symptoms, increased facial pressure, dizziness, fever, worse cough- need re-evaluation in urgent care

## 2025-01-21 ENCOUNTER — TELEPHONE (OUTPATIENT)
Dept: ONCOLOGY | Facility: MEDICAL CENTER | Age: 25
End: 2025-01-21
Payer: COMMERCIAL

## 2025-01-27 DIAGNOSIS — E27.40 ADRENAL INSUFFICIENCY (HCC): ICD-10-CM

## 2025-01-27 RX ORDER — COSYNTROPIN 0.25 MG/ML
250 INJECTION, POWDER, FOR SOLUTION INTRAMUSCULAR; INTRAVENOUS ONCE
OUTPATIENT
Start: 2025-01-28 | End: 2025-01-28

## 2025-02-26 ENCOUNTER — OUTPATIENT INFUSION SERVICES (OUTPATIENT)
Dept: ONCOLOGY | Facility: MEDICAL CENTER | Age: 25
End: 2025-02-26
Attending: INTERNAL MEDICINE
Payer: COMMERCIAL

## 2025-02-26 VITALS
HEART RATE: 62 BPM | TEMPERATURE: 98 F | RESPIRATION RATE: 18 BRPM | SYSTOLIC BLOOD PRESSURE: 121 MMHG | OXYGEN SATURATION: 100 % | DIASTOLIC BLOOD PRESSURE: 69 MMHG

## 2025-02-26 DIAGNOSIS — E27.40 ADRENAL INSUFFICIENCY (HCC): ICD-10-CM

## 2025-02-26 LAB
CORTIS SERPL-MCNC: 21.6 UG/DL (ref 0–23)
CORTIS SERPL-MCNC: 25.4 UG/DL (ref 0–23)
CORTIS SERPL-MCNC: 8.9 UG/DL (ref 0–23)

## 2025-02-26 PROCEDURE — 82533 TOTAL CORTISOL: CPT | Mod: 91

## 2025-02-26 PROCEDURE — 700111 HCHG RX REV CODE 636 W/ 250 OVERRIDE (IP): Mod: JZ | Performed by: INTERNAL MEDICINE

## 2025-02-26 PROCEDURE — 96374 THER/PROPH/DIAG INJ IV PUSH: CPT

## 2025-02-26 PROCEDURE — 82024 ASSAY OF ACTH: CPT

## 2025-02-26 RX ORDER — COSYNTROPIN 0.25 MG/ML
250 INJECTION, POWDER, FOR SOLUTION INTRAMUSCULAR; INTRAVENOUS ONCE
Status: CANCELLED | OUTPATIENT
Start: 2025-02-26 | End: 2025-02-26

## 2025-02-26 RX ORDER — COSYNTROPIN 0.25 MG/ML
250 INJECTION, POWDER, FOR SOLUTION INTRAMUSCULAR; INTRAVENOUS ONCE
Status: COMPLETED | OUTPATIENT
Start: 2025-02-26 | End: 2025-02-26

## 2025-02-26 RX ADMIN — COSYNTROPIN 250 MCG: 0.25 INJECTION, POWDER, LYOPHILIZED, FOR SOLUTION INTRAMUSCULAR; INTRAVENOUS at 08:37

## 2025-02-26 NOTE — PROGRESS NOTES
Joanne arrives ambulatory to [facility] for ACTH stim test. POC discussed, Joanne verbalizes understanding and agreement. PIV placed, flushes easily with brisk blood return observed.  baseline ACTH and cortisol levels drawn and in process.  Cosyntropin administered per MAR.  Cortisol level drawn at 30 minutes and 60 minutes after cosyntropin given. Joanne tolerated procedure well, discharged in NAD.

## 2025-02-28 LAB — ACTH PLAS-MCNC: 9.5 PG/ML (ref 7.2–63.3)
